# Patient Record
(demographics unavailable — no encounter records)

---

## 2025-03-25 NOTE — ASSESSMENT
[FreeTextEntry1] : 33 y/o F w dx of RA =pain, stiffness, swelling in past of neck, shoulders, elbows, hands, knees, ankles, feet =am stifness =on biologics  Hx seems supportive of RA. Will obtain serologies. Will obtain past images.  Will likely cw current regimen.   Plan  Labs to measure disease activity and monitor for drug toxicities  c/w MTX 15mg weekly w folic acid 1mg daily humira 40mg every 2 weeks RTO in 3 months or sooner as needed

## 2025-03-25 NOTE — HISTORY OF PRESENT ILLNESS
[FreeTextEntry1] : 33 y/o F w dx of RA, here to establish care  Pt RA dx 26. Pt says neck, shoulders, elbows, hands, knees, ankles, feet. Pt reports stiffness in am but no swelling.   Pt was started on MTX, xeljanz. Pt says xeljanz worked initially and then it stopped.   Pt currently 15mg weekly w folic acid 1mg daily  Pt has been humira 40mg eveyr other week.   Pt currently has not joint pain, stiffness, swelling; last flare 12/24. Pt was not taking medication consistently.   No fevers, h/a, rashes, hair loss, oral ulcers, epistaxis, sinusitis,  swollen glands, dry mouth, dry eyes, CP, SOB, cough, vision changes, abdominal pain, GERD, n/v/d, blood in stool or urine, focal weakness, sensory loss,  Raynaud's, joint pain, swelling, weight loss.   OB: no hx of miscarriage; no hx of preeclampsia